# Patient Record
Sex: FEMALE | Race: WHITE | NOT HISPANIC OR LATINO | Employment: UNEMPLOYED | URBAN - NONMETROPOLITAN AREA
[De-identification: names, ages, dates, MRNs, and addresses within clinical notes are randomized per-mention and may not be internally consistent; named-entity substitution may affect disease eponyms.]

---

## 2020-02-21 ENCOUNTER — HOSPITAL ENCOUNTER (EMERGENCY)
Facility: HOSPITAL | Age: 20
Discharge: STILL ADMITTED OR ADMINISTRATIVE | End: 2020-02-21
Attending: FAMILY MEDICINE

## 2020-02-21 ENCOUNTER — HOSPITAL ENCOUNTER (INPATIENT)
Facility: HOSPITAL | Age: 20
LOS: 1 days | Discharge: NURSING, REHAB OR LONG-TERM CARE | End: 2020-02-22
Attending: PSYCHIATRY & NEUROLOGY | Admitting: PSYCHIATRY & NEUROLOGY

## 2020-02-21 VITALS
OXYGEN SATURATION: 98 % | BODY MASS INDEX: 26.87 KG/M2 | RESPIRATION RATE: 18 BRPM | WEIGHT: 161.25 LBS | HEIGHT: 65 IN | TEMPERATURE: 97.8 F | HEART RATE: 73 BPM | SYSTOLIC BLOOD PRESSURE: 106 MMHG | DIASTOLIC BLOOD PRESSURE: 68 MMHG

## 2020-02-21 DIAGNOSIS — A49.9 BACTERIAL UTI: ICD-10-CM

## 2020-02-21 DIAGNOSIS — N39.0 BACTERIAL UTI: ICD-10-CM

## 2020-02-21 DIAGNOSIS — F19.10 POLYSUBSTANCE ABUSE (HCC): Primary | ICD-10-CM

## 2020-02-21 LAB
6-ACETYL MORPHINE: POSITIVE
ALBUMIN SERPL-MCNC: 4.5 G/DL (ref 3.5–5.2)
ALBUMIN/GLOB SERPL: 1.6 G/DL
ALP SERPL-CCNC: 106 U/L (ref 39–117)
ALT SERPL W P-5'-P-CCNC: 13 U/L (ref 1–33)
AMPHET+METHAMPHET UR QL: POSITIVE
ANION GAP SERPL CALCULATED.3IONS-SCNC: 12.6 MMOL/L (ref 5–15)
AST SERPL-CCNC: 24 U/L (ref 1–32)
B-HCG UR QL: NEGATIVE
BACTERIA UR QL AUTO: ABNORMAL /HPF
BARBITURATES UR QL SCN: NEGATIVE
BASOPHILS # BLD AUTO: 0.03 10*3/MM3 (ref 0–0.2)
BASOPHILS NFR BLD AUTO: 0.3 % (ref 0–1.5)
BENZODIAZ UR QL SCN: NEGATIVE
BILIRUB SERPL-MCNC: 0.4 MG/DL (ref 0.2–1.2)
BILIRUB UR QL STRIP: ABNORMAL
BUN BLD-MCNC: 9 MG/DL (ref 6–20)
BUN/CREAT SERPL: 11 (ref 7–25)
BUPRENORPHINE SERPL-MCNC: NEGATIVE NG/ML
C TRACH RRNA CVX QL NAA+PROBE: NOT DETECTED
CALCIUM SPEC-SCNC: 9.8 MG/DL (ref 8.6–10.5)
CANNABINOIDS SERPL QL: NEGATIVE
CHLORIDE SERPL-SCNC: 103 MMOL/L (ref 98–107)
CLARITY UR: ABNORMAL
CO2 SERPL-SCNC: 25.4 MMOL/L (ref 22–29)
COCAINE UR QL: NEGATIVE
COLOR UR: ABNORMAL
CREAT BLD-MCNC: 0.82 MG/DL (ref 0.57–1)
DEPRECATED RDW RBC AUTO: 45.4 FL (ref 37–54)
EOSINOPHIL # BLD AUTO: 0.42 10*3/MM3 (ref 0–0.4)
EOSINOPHIL NFR BLD AUTO: 4.1 % (ref 0.3–6.2)
ERYTHROCYTE [DISTWIDTH] IN BLOOD BY AUTOMATED COUNT: 13.4 % (ref 12.3–15.4)
ETHANOL BLD-MCNC: <10 MG/DL (ref 0–10)
ETHANOL UR QL: <0.01 %
GFR SERPL CREATININE-BSD FRML MDRD: 88 ML/MIN/1.73
GLOBULIN UR ELPH-MCNC: 2.8 GM/DL
GLUCOSE BLD-MCNC: 98 MG/DL (ref 65–99)
GLUCOSE UR STRIP-MCNC: NEGATIVE MG/DL
HCT VFR BLD AUTO: 42.1 % (ref 34–46.6)
HGB BLD-MCNC: 13.3 G/DL (ref 12–15.9)
HGB UR QL STRIP.AUTO: ABNORMAL
HYALINE CASTS UR QL AUTO: ABNORMAL /LPF
IMM GRANULOCYTES # BLD AUTO: 0.04 10*3/MM3 (ref 0–0.05)
IMM GRANULOCYTES NFR BLD AUTO: 0.4 % (ref 0–0.5)
KETONES UR QL STRIP: NEGATIVE
LEUKOCYTE ESTERASE UR QL STRIP.AUTO: ABNORMAL
LYMPHOCYTES # BLD AUTO: 4.25 10*3/MM3 (ref 0.7–3.1)
LYMPHOCYTES NFR BLD AUTO: 41.3 % (ref 19.6–45.3)
MAGNESIUM SERPL-MCNC: 2 MG/DL (ref 1.6–2.6)
MCH RBC QN AUTO: 29 PG (ref 26.6–33)
MCHC RBC AUTO-ENTMCNC: 31.6 G/DL (ref 31.5–35.7)
MCV RBC AUTO: 91.9 FL (ref 79–97)
METHADONE UR QL SCN: NEGATIVE
MONOCYTES # BLD AUTO: 0.6 10*3/MM3 (ref 0.1–0.9)
MONOCYTES NFR BLD AUTO: 5.8 % (ref 5–12)
N GONORRHOEA RRNA SPEC QL NAA+PROBE: DETECTED
NEUTROPHILS # BLD AUTO: 4.96 10*3/MM3 (ref 1.7–7)
NEUTROPHILS NFR BLD AUTO: 48.1 % (ref 42.7–76)
NITRITE UR QL STRIP: POSITIVE
NRBC BLD AUTO-RTO: 0 /100 WBC (ref 0–0.2)
OPIATES UR QL: POSITIVE
OXYCODONE UR QL SCN: NEGATIVE
PCP UR QL SCN: NEGATIVE
PH UR STRIP.AUTO: <=5 [PH] (ref 5–8)
PLATELET # BLD AUTO: 233 10*3/MM3 (ref 140–450)
PMV BLD AUTO: 9.4 FL (ref 6–12)
POTASSIUM BLD-SCNC: 3.4 MMOL/L (ref 3.5–5.2)
PROT SERPL-MCNC: 7.3 G/DL (ref 6–8.5)
PROT UR QL STRIP: ABNORMAL
RBC # BLD AUTO: 4.58 10*6/MM3 (ref 3.77–5.28)
RBC # UR: ABNORMAL /HPF
REF LAB TEST METHOD: ABNORMAL
SODIUM BLD-SCNC: 141 MMOL/L (ref 136–145)
SP GR UR STRIP: 1.03 (ref 1–1.03)
SQUAMOUS #/AREA URNS HPF: ABNORMAL /HPF
UROBILINOGEN UR QL STRIP: ABNORMAL
WBC NRBC COR # BLD: 10.3 10*3/MM3 (ref 3.4–10.8)
WBC UR QL AUTO: ABNORMAL /HPF

## 2020-02-21 PROCEDURE — 80307 DRUG TEST PRSMV CHEM ANLYZR: CPT | Performed by: EMERGENCY MEDICINE

## 2020-02-21 PROCEDURE — 87591 N.GONORRHOEAE DNA AMP PROB: CPT | Performed by: PHYSICIAN ASSISTANT

## 2020-02-21 PROCEDURE — 93010 ELECTROCARDIOGRAM REPORT: CPT | Performed by: INTERNAL MEDICINE

## 2020-02-21 PROCEDURE — 83735 ASSAY OF MAGNESIUM: CPT | Performed by: EMERGENCY MEDICINE

## 2020-02-21 PROCEDURE — 87086 URINE CULTURE/COLONY COUNT: CPT | Performed by: PHYSICIAN ASSISTANT

## 2020-02-21 PROCEDURE — 87491 CHLMYD TRACH DNA AMP PROBE: CPT | Performed by: PHYSICIAN ASSISTANT

## 2020-02-21 PROCEDURE — 85025 COMPLETE CBC W/AUTO DIFF WBC: CPT | Performed by: EMERGENCY MEDICINE

## 2020-02-21 PROCEDURE — 93005 ELECTROCARDIOGRAM TRACING: CPT | Performed by: PSYCHIATRY & NEUROLOGY

## 2020-02-21 PROCEDURE — 81001 URINALYSIS AUTO W/SCOPE: CPT | Performed by: EMERGENCY MEDICINE

## 2020-02-21 PROCEDURE — 81025 URINE PREGNANCY TEST: CPT | Performed by: EMERGENCY MEDICINE

## 2020-02-21 PROCEDURE — HZ2ZZZZ DETOXIFICATION SERVICES FOR SUBSTANCE ABUSE TREATMENT: ICD-10-PCS | Performed by: PSYCHIATRY & NEUROLOGY

## 2020-02-21 PROCEDURE — 80053 COMPREHEN METABOLIC PANEL: CPT | Performed by: EMERGENCY MEDICINE

## 2020-02-21 RX ORDER — ONDANSETRON 4 MG/1
4 TABLET, FILM COATED ORAL EVERY 6 HOURS PRN
Status: DISCONTINUED | OUTPATIENT
Start: 2020-02-21 | End: 2020-02-22

## 2020-02-21 RX ORDER — CLONIDINE HYDROCHLORIDE 0.1 MG/1
0.1 TABLET ORAL 4 TIMES DAILY PRN
Status: DISCONTINUED | OUTPATIENT
Start: 2020-02-21 | End: 2020-02-22

## 2020-02-21 RX ORDER — LORAZEPAM 1 MG/1
1 TABLET ORAL EVERY 4 HOURS PRN
Status: DISCONTINUED | OUTPATIENT
Start: 2020-02-24 | End: 2020-02-22

## 2020-02-21 RX ORDER — HYDROXYZINE 50 MG/1
50 TABLET, FILM COATED ORAL NIGHTLY
Status: DISCONTINUED | OUTPATIENT
Start: 2020-02-21 | End: 2020-02-22

## 2020-02-21 RX ORDER — ZONISAMIDE 100 MG/1
400 CAPSULE ORAL NIGHTLY
Status: DISCONTINUED | OUTPATIENT
Start: 2020-02-21 | End: 2020-02-22

## 2020-02-21 RX ORDER — ZIPRASIDONE HYDROCHLORIDE 20 MG/1
20 CAPSULE ORAL 2 TIMES DAILY WITH MEALS
Status: DISCONTINUED | OUTPATIENT
Start: 2020-02-21 | End: 2020-02-22

## 2020-02-21 RX ORDER — ZONISAMIDE 100 MG/1
400 CAPSULE ORAL NIGHTLY
COMMUNITY

## 2020-02-21 RX ORDER — LORAZEPAM 2 MG/1
2 TABLET ORAL
Status: DISCONTINUED | OUTPATIENT
Start: 2020-02-21 | End: 2020-02-22

## 2020-02-21 RX ORDER — LORAZEPAM 0.5 MG/1
0.5 TABLET ORAL
Status: DISCONTINUED | OUTPATIENT
Start: 2020-02-25 | End: 2020-02-22

## 2020-02-21 RX ORDER — ACETAMINOPHEN 325 MG/1
650 TABLET ORAL EVERY 6 HOURS PRN
Status: DISCONTINUED | OUTPATIENT
Start: 2020-02-21 | End: 2020-02-22

## 2020-02-21 RX ORDER — BENZTROPINE MESYLATE 1 MG/ML
1 INJECTION INTRAMUSCULAR; INTRAVENOUS ONCE AS NEEDED
Status: DISCONTINUED | OUTPATIENT
Start: 2020-02-21 | End: 2020-02-22

## 2020-02-21 RX ORDER — NITROFURANTOIN 25; 75 MG/1; MG/1
100 CAPSULE ORAL EVERY 12 HOURS SCHEDULED
Status: DISCONTINUED | OUTPATIENT
Start: 2020-02-21 | End: 2020-02-22

## 2020-02-21 RX ORDER — LEVOTHYROXINE SODIUM 0.03 MG/1
25 TABLET ORAL DAILY
Status: DISCONTINUED | OUTPATIENT
Start: 2020-02-22 | End: 2020-02-22

## 2020-02-21 RX ORDER — HYDROXYZINE 50 MG/1
50 TABLET, FILM COATED ORAL EVERY 6 HOURS PRN
Status: DISCONTINUED | OUTPATIENT
Start: 2020-02-21 | End: 2020-02-22

## 2020-02-21 RX ORDER — LOPERAMIDE HYDROCHLORIDE 2 MG/1
2 CAPSULE ORAL
Status: DISCONTINUED | OUTPATIENT
Start: 2020-02-21 | End: 2020-02-22

## 2020-02-21 RX ORDER — CLONIDINE HYDROCHLORIDE 0.1 MG/1
0.1 TABLET ORAL 3 TIMES DAILY PRN
Status: DISCONTINUED | OUTPATIENT
Start: 2020-02-23 | End: 2020-02-22

## 2020-02-21 RX ORDER — BENZONATATE 100 MG/1
100 CAPSULE ORAL 3 TIMES DAILY PRN
Status: DISCONTINUED | OUTPATIENT
Start: 2020-02-21 | End: 2020-02-22

## 2020-02-21 RX ORDER — LAMOTRIGINE 200 MG/1
200 TABLET ORAL 2 TIMES DAILY
COMMUNITY

## 2020-02-21 RX ORDER — LORAZEPAM 2 MG/1
2 TABLET ORAL
Status: DISCONTINUED | OUTPATIENT
Start: 2020-02-22 | End: 2020-02-22

## 2020-02-21 RX ORDER — LEVOTHYROXINE SODIUM 0.03 MG/1
25 TABLET ORAL DAILY
COMMUNITY

## 2020-02-21 RX ORDER — CLONIDINE HYDROCHLORIDE 0.1 MG/1
0.1 TABLET ORAL 4 TIMES DAILY PRN
Status: DISCONTINUED | OUTPATIENT
Start: 2020-02-22 | End: 2020-02-22

## 2020-02-21 RX ORDER — LAMOTRIGINE 100 MG/1
200 TABLET ORAL 2 TIMES DAILY
Status: DISCONTINUED | OUTPATIENT
Start: 2020-02-21 | End: 2020-02-22

## 2020-02-21 RX ORDER — FAMOTIDINE 20 MG/1
20 TABLET, FILM COATED ORAL 2 TIMES DAILY PRN
Status: DISCONTINUED | OUTPATIENT
Start: 2020-02-21 | End: 2020-02-22

## 2020-02-21 RX ORDER — AZITHROMYCIN 250 MG/1
1000 TABLET, FILM COATED ORAL ONCE
Status: DISCONTINUED | OUTPATIENT
Start: 2020-02-22 | End: 2020-02-22

## 2020-02-21 RX ORDER — CLONIDINE HYDROCHLORIDE 0.1 MG/1
0.1 TABLET ORAL 4 TIMES DAILY PRN
Status: DISCONTINUED | OUTPATIENT
Start: 2020-02-21 | End: 2020-02-21

## 2020-02-21 RX ORDER — TRAZODONE HYDROCHLORIDE 50 MG/1
50 TABLET ORAL NIGHTLY PRN
Status: DISCONTINUED | OUTPATIENT
Start: 2020-02-21 | End: 2020-02-22

## 2020-02-21 RX ORDER — LORAZEPAM 0.5 MG/1
0.5 TABLET ORAL EVERY 4 HOURS PRN
Status: DISCONTINUED | OUTPATIENT
Start: 2020-02-25 | End: 2020-02-22

## 2020-02-21 RX ORDER — ALUMINA, MAGNESIA, AND SIMETHICONE 2400; 2400; 240 MG/30ML; MG/30ML; MG/30ML
15 SUSPENSION ORAL EVERY 6 HOURS PRN
Status: DISCONTINUED | OUTPATIENT
Start: 2020-02-21 | End: 2020-02-22

## 2020-02-21 RX ORDER — LORAZEPAM 2 MG/1
2 TABLET ORAL EVERY 4 HOURS PRN
Status: DISCONTINUED | OUTPATIENT
Start: 2020-02-22 | End: 2020-02-22

## 2020-02-21 RX ORDER — BENZTROPINE MESYLATE 1 MG/1
2 TABLET ORAL ONCE AS NEEDED
Status: DISCONTINUED | OUTPATIENT
Start: 2020-02-21 | End: 2020-02-22

## 2020-02-21 RX ORDER — CYCLOBENZAPRINE HCL 10 MG
10 TABLET ORAL 3 TIMES DAILY PRN
Status: DISCONTINUED | OUTPATIENT
Start: 2020-02-21 | End: 2020-02-22

## 2020-02-21 RX ORDER — LORAZEPAM 1 MG/1
1 TABLET ORAL
Status: DISCONTINUED | OUTPATIENT
Start: 2020-02-24 | End: 2020-02-22

## 2020-02-21 RX ORDER — POTASSIUM CHLORIDE 20 MEQ/1
20 TABLET, EXTENDED RELEASE ORAL ONCE
Status: COMPLETED | OUTPATIENT
Start: 2020-02-21 | End: 2020-02-21

## 2020-02-21 RX ORDER — DICYCLOMINE HYDROCHLORIDE 10 MG/1
10 CAPSULE ORAL 3 TIMES DAILY PRN
Status: DISCONTINUED | OUTPATIENT
Start: 2020-02-21 | End: 2020-02-22

## 2020-02-21 RX ORDER — CLONIDINE HYDROCHLORIDE 0.1 MG/1
0.1 TABLET ORAL 2 TIMES DAILY PRN
Status: DISCONTINUED | OUTPATIENT
Start: 2020-02-24 | End: 2020-02-22

## 2020-02-21 RX ORDER — ECHINACEA PURPUREA EXTRACT 125 MG
2 TABLET ORAL AS NEEDED
Status: DISCONTINUED | OUTPATIENT
Start: 2020-02-21 | End: 2020-02-22

## 2020-02-21 RX ORDER — CLONIDINE HYDROCHLORIDE 0.1 MG/1
0.1 TABLET ORAL DAILY PRN
Status: DISCONTINUED | OUTPATIENT
Start: 2020-02-25 | End: 2020-02-22

## 2020-02-21 RX ORDER — IBUPROFEN 400 MG/1
400 TABLET ORAL EVERY 6 HOURS PRN
Status: DISCONTINUED | OUTPATIENT
Start: 2020-02-21 | End: 2020-02-22

## 2020-02-21 RX ORDER — HYDROXYZINE PAMOATE 50 MG/1
50 CAPSULE ORAL
COMMUNITY
End: 2020-02-25 | Stop reason: HOSPADM

## 2020-02-21 RX ADMIN — ZIPRASIDONE HYDROCHLORIDE 20 MG: 20 CAPSULE ORAL at 22:45

## 2020-02-21 RX ADMIN — POTASSIUM CHLORIDE 20 MEQ: 1500 TABLET, EXTENDED RELEASE ORAL at 20:09

## 2020-02-21 RX ADMIN — HYDROXYZINE HYDROCHLORIDE 50 MG: 50 TABLET ORAL at 22:41

## 2020-02-21 RX ADMIN — IBUPROFEN 400 MG: 400 TABLET, FILM COATED ORAL at 23:03

## 2020-02-21 RX ADMIN — NITROFURANTOIN MONOHYDRATE/MACROCRYSTALLINE 100 MG: 25; 75 CAPSULE ORAL at 22:41

## 2020-02-21 RX ADMIN — LAMOTRIGINE 200 MG: 100 TABLET ORAL at 22:45

## 2020-02-21 NOTE — NURSING NOTE
Patient to intake. Safety checks initiated.  pockets emptied and search completed with two staff members present. Items logged and placed in cabinet in intake area. Pt placed in safe room for evaluation. Will continue to monitor pt status. Waiting for ED provider to clear pt me  dically. Upon search of patient. One tube of chap stick found in bra. Placed in belonging bag and on sheet. Patient noted to be  A little drowsy upon search.

## 2020-02-22 ENCOUNTER — APPOINTMENT (OUTPATIENT)
Dept: GENERAL RADIOLOGY | Facility: HOSPITAL | Age: 20
End: 2020-02-22

## 2020-02-22 ENCOUNTER — HOSPITAL ENCOUNTER (INPATIENT)
Facility: HOSPITAL | Age: 20
LOS: 3 days | Discharge: NURSING, REHAB OR LONG-TERM CARE | End: 2020-02-25
Attending: HOSPITALIST | Admitting: HOSPITALIST

## 2020-02-22 VITALS
RESPIRATION RATE: 12 BRPM | SYSTOLIC BLOOD PRESSURE: 116 MMHG | HEIGHT: 65 IN | BODY MASS INDEX: 26.66 KG/M2 | WEIGHT: 160 LBS | TEMPERATURE: 97.1 F | OXYGEN SATURATION: 62 % | HEART RATE: 70 BPM | DIASTOLIC BLOOD PRESSURE: 64 MMHG

## 2020-02-22 PROBLEM — R41.89 UNRESPONSIVE: Status: ACTIVE | Noted: 2020-02-22

## 2020-02-22 LAB
A-A DO2: 12.2 MMHG (ref 0–300)
A-A DO2: 25.4 MMHG (ref 0–300)
ALBUMIN SERPL-MCNC: 3.81 G/DL (ref 3.5–5.2)
ALBUMIN/GLOB SERPL: 1.5 G/DL
ALP SERPL-CCNC: 96 U/L (ref 39–117)
ALT SERPL W P-5'-P-CCNC: 14 U/L (ref 1–33)
ANION GAP SERPL CALCULATED.3IONS-SCNC: 11.1 MMOL/L (ref 5–15)
ARTERIAL PATENCY WRIST A: POSITIVE
ARTERIAL PATENCY WRIST A: POSITIVE
AST SERPL-CCNC: 22 U/L (ref 1–32)
ATMOSPHERIC PRESS: 737 MMHG
ATMOSPHERIC PRESS: 737 MMHG
BACTERIA SPEC AEROBE CULT: ABNORMAL
BASE EXCESS BLDA CALC-SCNC: -1.4 MMOL/L (ref 0–2)
BASE EXCESS BLDA CALC-SCNC: -8 MMOL/L (ref 0–2)
BASOPHILS # BLD AUTO: 0.04 10*3/MM3 (ref 0–0.2)
BASOPHILS NFR BLD AUTO: 0.2 % (ref 0–1.5)
BDY SITE: ABNORMAL
BDY SITE: ABNORMAL
BILIRUB SERPL-MCNC: 0.5 MG/DL (ref 0.2–1.2)
BODY TEMPERATURE: 0 C
BODY TEMPERATURE: 0 C
BUN BLD-MCNC: 10 MG/DL (ref 6–20)
BUN/CREAT SERPL: 11 (ref 7–25)
CALCIUM SPEC-SCNC: 8.6 MG/DL (ref 8.6–10.5)
CHLORIDE SERPL-SCNC: 103 MMOL/L (ref 98–107)
CK SERPL-CCNC: 333 U/L (ref 20–180)
CK SERPL-CCNC: 397 U/L (ref 20–180)
CO2 BLDA-SCNC: 25.6 MMOL/L (ref 22–33)
CO2 BLDA-SCNC: 26.8 MMOL/L (ref 22–33)
CO2 SERPL-SCNC: 22.9 MMOL/L (ref 22–29)
COHGB MFR BLD: 0.8 % (ref 0–5)
COHGB MFR BLD: 0.8 % (ref 0–5)
CREAT BLD-MCNC: 0.91 MG/DL (ref 0.57–1)
D-LACTATE SERPL-SCNC: 1.1 MMOL/L (ref 0.5–2)
DEPRECATED RDW RBC AUTO: 45.4 FL (ref 37–54)
EOSINOPHIL # BLD AUTO: 0.4 10*3/MM3 (ref 0–0.4)
EOSINOPHIL NFR BLD AUTO: 2.3 % (ref 0.3–6.2)
ERYTHROCYTE [DISTWIDTH] IN BLOOD BY AUTOMATED COUNT: 13.4 % (ref 12.3–15.4)
GAS FLOW AIRWAY: 2 LPM
GFR SERPL CREATININE-BSD FRML MDRD: 78 ML/MIN/1.73
GLOBULIN UR ELPH-MCNC: 2.6 GM/DL
GLUCOSE BLD-MCNC: 87 MG/DL (ref 65–99)
HAV IGM SERPL QL IA: ABNORMAL
HBV CORE IGM SERPL QL IA: ABNORMAL
HBV SURFACE AG SERPL QL IA: ABNORMAL
HCO3 BLDA-SCNC: 24.2 MMOL/L (ref 20–26)
HCO3 BLDA-SCNC: 24.3 MMOL/L (ref 20–26)
HCT VFR BLD AUTO: 39.7 % (ref 34–46.6)
HCT VFR BLD CALC: 37.7 % (ref 38–51)
HCT VFR BLD CALC: 40.8 % (ref 38–51)
HCV AB SER DONR QL: REACTIVE
HGB BLD-MCNC: 12.5 G/DL (ref 12–15.9)
HGB BLDA-MCNC: 12.3 G/DL (ref 13.5–17.5)
HGB BLDA-MCNC: 13.3 G/DL (ref 13.5–17.5)
HIV1+2 AB SER QL: NORMAL
HOLD SPECIMEN: NORMAL
HOROWITZ INDEX BLD+IHG-RTO: 21 %
HOROWITZ INDEX BLD+IHG-RTO: 28 %
IMM GRANULOCYTES # BLD AUTO: 0.1 10*3/MM3 (ref 0–0.05)
IMM GRANULOCYTES NFR BLD AUTO: 0.6 % (ref 0–0.5)
LYMPHOCYTES # BLD AUTO: 3.3 10*3/MM3 (ref 0.7–3.1)
LYMPHOCYTES NFR BLD AUTO: 18.7 % (ref 19.6–45.3)
Lab: ABNORMAL
MCH RBC QN AUTO: 29.2 PG (ref 26.6–33)
MCHC RBC AUTO-ENTMCNC: 31.5 G/DL (ref 31.5–35.7)
MCV RBC AUTO: 92.8 FL (ref 79–97)
METHGB BLD QL: 0.2 % (ref 0–3)
METHGB BLD QL: 0.3 % (ref 0–3)
MODALITY: ABNORMAL
MODALITY: ABNORMAL
MONOCYTES # BLD AUTO: 0.8 10*3/MM3 (ref 0.1–0.9)
MONOCYTES NFR BLD AUTO: 4.5 % (ref 5–12)
NEUTROPHILS # BLD AUTO: 12.99 10*3/MM3 (ref 1.7–7)
NEUTROPHILS NFR BLD AUTO: 73.7 % (ref 42.7–76)
NOTE: ABNORMAL
NOTE: ABNORMAL
NOTIFIED BY: ABNORMAL
NOTIFIED WHO: ABNORMAL
NRBC BLD AUTO-RTO: 0 /100 WBC (ref 0–0.2)
OXYHGB MFR BLDV: 43.2 % (ref 94–99)
OXYHGB MFR BLDV: 98 % (ref 94–99)
PCO2 BLDA: 43.9 MM HG (ref 35–45)
PCO2 BLDA: 86.4 MM HG (ref 35–45)
PCO2 TEMP ADJ BLD: ABNORMAL MM[HG]
PCO2 TEMP ADJ BLD: ABNORMAL MM[HG]
PH BLDA: 7.05 PH UNITS (ref 7.35–7.45)
PH BLDA: 7.35 PH UNITS (ref 7.35–7.45)
PH, TEMP CORRECTED: ABNORMAL
PH, TEMP CORRECTED: ABNORMAL
PLATELET # BLD AUTO: 214 10*3/MM3 (ref 140–450)
PMV BLD AUTO: 9.5 FL (ref 6–12)
PO2 BLDA: 119 MM HG (ref 83–108)
PO2 BLDA: 35.2 MM HG (ref 83–108)
PO2 TEMP ADJ BLD: ABNORMAL MM[HG]
PO2 TEMP ADJ BLD: ABNORMAL MM[HG]
POTASSIUM BLD-SCNC: 3.3 MMOL/L (ref 3.5–5.2)
POTASSIUM BLD-SCNC: 3.6 MMOL/L (ref 3.5–5.2)
PROT SERPL-MCNC: 6.4 G/DL (ref 6–8.5)
RBC # BLD AUTO: 4.28 10*6/MM3 (ref 3.77–5.28)
SAO2 % BLDCOA: 43.7 % (ref 94–99)
SAO2 % BLDCOA: 99 % (ref 94–99)
SODIUM BLD-SCNC: 137 MMOL/L (ref 136–145)
T3FREE SERPL-MCNC: 6.29 PG/ML (ref 2–4.4)
T4 FREE SERPL-MCNC: 1.63 NG/DL (ref 0.93–1.7)
TSH SERPL DL<=0.05 MIU/L-ACNC: 13.51 UIU/ML (ref 0.27–4.2)
VENTILATOR MODE: ABNORMAL
VENTILATOR MODE: ABNORMAL
WBC NRBC COR # BLD: 17.63 10*3/MM3 (ref 3.4–10.8)

## 2020-02-22 PROCEDURE — 94799 UNLISTED PULMONARY SVC/PX: CPT

## 2020-02-22 PROCEDURE — 84481 FREE ASSAY (FT-3): CPT | Performed by: HOSPITALIST

## 2020-02-22 PROCEDURE — 87040 BLOOD CULTURE FOR BACTERIA: CPT | Performed by: NURSE PRACTITIONER

## 2020-02-22 PROCEDURE — 36600 WITHDRAWAL OF ARTERIAL BLOOD: CPT

## 2020-02-22 PROCEDURE — 71045 X-RAY EXAM CHEST 1 VIEW: CPT

## 2020-02-22 PROCEDURE — 25010000002 NALOXONE PER 1 MG: Performed by: NURSE PRACTITIONER

## 2020-02-22 PROCEDURE — 0BH17EZ INSERTION OF ENDOTRACHEAL AIRWAY INTO TRACHEA, VIA NATURAL OR ARTIFICIAL OPENING: ICD-10-PCS | Performed by: HOSPITALIST

## 2020-02-22 PROCEDURE — 84439 ASSAY OF FREE THYROXINE: CPT | Performed by: HOSPITALIST

## 2020-02-22 PROCEDURE — 80053 COMPREHEN METABOLIC PANEL: CPT | Performed by: HOSPITALIST

## 2020-02-22 PROCEDURE — 93005 ELECTROCARDIOGRAM TRACING: CPT | Performed by: HOSPITALIST

## 2020-02-22 PROCEDURE — 80074 ACUTE HEPATITIS PANEL: CPT | Performed by: PSYCHIATRY & NEUROLOGY

## 2020-02-22 PROCEDURE — 85025 COMPLETE CBC W/AUTO DIFF WBC: CPT | Performed by: HOSPITALIST

## 2020-02-22 PROCEDURE — 25010000002 LORAZEPAM PER 2 MG

## 2020-02-22 PROCEDURE — 25010000002 CEFTRIAXONE: Performed by: INTERNAL MEDICINE

## 2020-02-22 PROCEDURE — 94002 VENT MGMT INPAT INIT DAY: CPT

## 2020-02-22 PROCEDURE — 25010000002 NALOXONE PER 1 MG: Performed by: INTERNAL MEDICINE

## 2020-02-22 PROCEDURE — 82550 ASSAY OF CK (CPK): CPT | Performed by: HOSPITALIST

## 2020-02-22 PROCEDURE — 82805 BLOOD GASES W/O2 SATURATION: CPT

## 2020-02-22 PROCEDURE — G0432 EIA HIV-1/HIV-2 SCREEN: HCPCS | Performed by: PSYCHIATRY & NEUROLOGY

## 2020-02-22 PROCEDURE — 31500 INSERT EMERGENCY AIRWAY: CPT

## 2020-02-22 PROCEDURE — 5A1935Z RESPIRATORY VENTILATION, LESS THAN 24 CONSECUTIVE HOURS: ICD-10-PCS | Performed by: HOSPITALIST

## 2020-02-22 PROCEDURE — 99223 1ST HOSP IP/OBS HIGH 75: CPT | Performed by: HOSPITALIST

## 2020-02-22 PROCEDURE — 71045 X-RAY EXAM CHEST 1 VIEW: CPT | Performed by: RADIOLOGY

## 2020-02-22 PROCEDURE — 83605 ASSAY OF LACTIC ACID: CPT | Performed by: HOSPITALIST

## 2020-02-22 PROCEDURE — 82375 ASSAY CARBOXYHB QUANT: CPT

## 2020-02-22 PROCEDURE — 25010000002 NALOXONE PER 1 MG: Performed by: HOSPITALIST

## 2020-02-22 PROCEDURE — 84132 ASSAY OF SERUM POTASSIUM: CPT | Performed by: PSYCHIATRY & NEUROLOGY

## 2020-02-22 PROCEDURE — 93010 ELECTROCARDIOGRAM REPORT: CPT | Performed by: INTERNAL MEDICINE

## 2020-02-22 PROCEDURE — 84443 ASSAY THYROID STIM HORMONE: CPT | Performed by: HOSPITALIST

## 2020-02-22 PROCEDURE — 25010000002 CEFTRIAXONE PER 250 MG: Performed by: HOSPITALIST

## 2020-02-22 PROCEDURE — 25010000002 PROPOFOL 10 MG/ML EMULSION: Performed by: FAMILY MEDICINE

## 2020-02-22 PROCEDURE — 83050 HGB METHEMOGLOBIN QUAN: CPT

## 2020-02-22 RX ORDER — NICOTINE 21 MG/24HR
1 PATCH, TRANSDERMAL 24 HOURS TRANSDERMAL
Status: DISCONTINUED | OUTPATIENT
Start: 2020-02-23 | End: 2020-02-25 | Stop reason: HOSPADM

## 2020-02-22 RX ORDER — LEVOTHYROXINE SODIUM 0.03 MG/1
25 TABLET ORAL DAILY
Status: DISCONTINUED | OUTPATIENT
Start: 2020-02-22 | End: 2020-02-25 | Stop reason: HOSPADM

## 2020-02-22 RX ORDER — NALOXONE HCL 0.4 MG/ML
0.4 VIAL (ML) INJECTION AS NEEDED
Status: DISCONTINUED | OUTPATIENT
Start: 2020-02-22 | End: 2020-02-25 | Stop reason: HOSPADM

## 2020-02-22 RX ORDER — SODIUM CHLORIDE 0.9 % (FLUSH) 0.9 %
10 SYRINGE (ML) INJECTION EVERY 12 HOURS SCHEDULED
Status: DISCONTINUED | OUTPATIENT
Start: 2020-02-22 | End: 2020-02-25 | Stop reason: HOSPADM

## 2020-02-22 RX ORDER — NALOXONE HYDROCHLORIDE 1 MG/ML
2 INJECTION INTRAMUSCULAR; INTRAVENOUS; SUBCUTANEOUS ONCE
Status: COMPLETED | OUTPATIENT
Start: 2020-02-22 | End: 2020-02-22

## 2020-02-22 RX ORDER — PROPOFOL 10 MG/ML
40 VIAL (ML) INTRAVENOUS ONCE
Status: COMPLETED | OUTPATIENT
Start: 2020-02-22 | End: 2020-02-22

## 2020-02-22 RX ORDER — SODIUM CHLORIDE 0.9 % (FLUSH) 0.9 %
10 SYRINGE (ML) INJECTION AS NEEDED
Status: DISCONTINUED | OUTPATIENT
Start: 2020-02-22 | End: 2020-02-25 | Stop reason: HOSPADM

## 2020-02-22 RX ORDER — ZIPRASIDONE HYDROCHLORIDE 20 MG/1
20 CAPSULE ORAL 2 TIMES DAILY WITH MEALS
Status: DISCONTINUED | OUTPATIENT
Start: 2020-02-22 | End: 2020-02-25 | Stop reason: HOSPADM

## 2020-02-22 RX ORDER — ZONISAMIDE 100 MG/1
400 CAPSULE ORAL NIGHTLY
Status: DISCONTINUED | OUTPATIENT
Start: 2020-02-22 | End: 2020-02-25 | Stop reason: HOSPADM

## 2020-02-22 RX ORDER — MAGNESIUM SULFATE HEPTAHYDRATE 40 MG/ML
4 INJECTION, SOLUTION INTRAVENOUS AS NEEDED
Status: DISCONTINUED | OUTPATIENT
Start: 2020-02-22 | End: 2020-02-25 | Stop reason: HOSPADM

## 2020-02-22 RX ORDER — ETOMIDATE 2 MG/ML
40 INJECTION INTRAVENOUS ONCE
Status: COMPLETED | OUTPATIENT
Start: 2020-02-22 | End: 2020-02-22

## 2020-02-22 RX ORDER — LORAZEPAM 2 MG/ML
INJECTION INTRAMUSCULAR
Status: DISCONTINUED
Start: 2020-02-22 | End: 2020-02-22 | Stop reason: HOSPADM

## 2020-02-22 RX ORDER — LAMOTRIGINE 100 MG/1
200 TABLET ORAL 2 TIMES DAILY
Status: DISCONTINUED | OUTPATIENT
Start: 2020-02-22 | End: 2020-02-25 | Stop reason: HOSPADM

## 2020-02-22 RX ORDER — MIDAZOLAM HCL IN 0.9 % NACL/PF 1 MG/ML
PLASTIC BAG, INJECTION (ML) INTRAVENOUS
Status: COMPLETED
Start: 2020-02-22 | End: 2020-02-22

## 2020-02-22 RX ORDER — ZIPRASIDONE HYDROCHLORIDE 20 MG/1
20 CAPSULE ORAL 2 TIMES DAILY WITH MEALS
Status: CANCELLED | OUTPATIENT
Start: 2020-02-22

## 2020-02-22 RX ORDER — ACETAMINOPHEN 325 MG/1
650 TABLET ORAL EVERY 6 HOURS PRN
Status: DISCONTINUED | OUTPATIENT
Start: 2020-02-22 | End: 2020-02-25 | Stop reason: HOSPADM

## 2020-02-22 RX ORDER — SODIUM CHLORIDE 9 MG/ML
50 INJECTION, SOLUTION INTRAVENOUS CONTINUOUS
Status: DISCONTINUED | OUTPATIENT
Start: 2020-02-22 | End: 2020-02-24

## 2020-02-22 RX ORDER — NALOXONE HYDROCHLORIDE 1 MG/ML
1 INJECTION INTRAMUSCULAR; INTRAVENOUS; SUBCUTANEOUS ONCE
Status: DISCONTINUED | OUTPATIENT
Start: 2020-02-22 | End: 2020-02-25 | Stop reason: HOSPADM

## 2020-02-22 RX ORDER — MAGNESIUM SULFATE HEPTAHYDRATE 40 MG/ML
2 INJECTION, SOLUTION INTRAVENOUS AS NEEDED
Status: DISCONTINUED | OUTPATIENT
Start: 2020-02-22 | End: 2020-02-25 | Stop reason: HOSPADM

## 2020-02-22 RX ORDER — MAGNESIUM SULFATE 1 G/100ML
1 INJECTION INTRAVENOUS AS NEEDED
Status: DISCONTINUED | OUTPATIENT
Start: 2020-02-22 | End: 2020-02-25 | Stop reason: HOSPADM

## 2020-02-22 RX ORDER — NALOXONE HYDROCHLORIDE 0.4 MG/ML
1 INJECTION, SOLUTION INTRAMUSCULAR; INTRAVENOUS; SUBCUTANEOUS ONCE
Status: COMPLETED | OUTPATIENT
Start: 2020-02-22 | End: 2020-02-22

## 2020-02-22 RX ORDER — NICOTINE 21 MG/24HR
1 PATCH, TRANSDERMAL 24 HOURS TRANSDERMAL ONCE
Status: COMPLETED | OUTPATIENT
Start: 2020-02-22 | End: 2020-02-23

## 2020-02-22 RX ORDER — HYDROXYZINE 50 MG/1
50 TABLET, FILM COATED ORAL NIGHTLY
Status: CANCELLED | OUTPATIENT
Start: 2020-02-22

## 2020-02-22 RX ORDER — AZITHROMYCIN 250 MG/1
1000 TABLET, FILM COATED ORAL ONCE
Status: COMPLETED | OUTPATIENT
Start: 2020-02-22 | End: 2020-02-25

## 2020-02-22 RX ORDER — LEVOTHYROXINE SODIUM 0.03 MG/1
25 TABLET ORAL DAILY
Status: CANCELLED | OUTPATIENT
Start: 2020-02-22

## 2020-02-22 RX ORDER — LAMOTRIGINE 100 MG/1
200 TABLET ORAL 2 TIMES DAILY
Status: CANCELLED | OUTPATIENT
Start: 2020-02-22

## 2020-02-22 RX ORDER — ZONISAMIDE 100 MG/1
400 CAPSULE ORAL NIGHTLY
Status: CANCELLED | OUTPATIENT
Start: 2020-02-22

## 2020-02-22 RX ORDER — NITROFURANTOIN 25; 75 MG/1; MG/1
100 CAPSULE ORAL EVERY 12 HOURS SCHEDULED
Status: DISCONTINUED | OUTPATIENT
Start: 2020-02-22 | End: 2020-02-25 | Stop reason: HOSPADM

## 2020-02-22 RX ADMIN — LAMOTRIGINE 200 MG: 100 TABLET ORAL at 21:50

## 2020-02-22 RX ADMIN — SODIUM CHLORIDE, PRESERVATIVE FREE 10 ML: 5 INJECTION INTRAVENOUS at 21:51

## 2020-02-22 RX ADMIN — PROPOFOL 40 MG: 10 INJECTION, EMULSION INTRAVENOUS at 02:50

## 2020-02-22 RX ADMIN — NALOXONE HYDROCHLORIDE 2 MG: 1 INJECTION PARENTERAL at 23:40

## 2020-02-22 RX ADMIN — BENZOCAINE AND MENTHOL 1 LOZENGE: 15; 3.6 LOZENGE ORAL at 23:44

## 2020-02-22 RX ADMIN — PROPOFOL 40 MG: 10 INJECTION, EMULSION INTRAVENOUS at 02:40

## 2020-02-22 RX ADMIN — NALOXONE HYDROCHLORIDE 2 MG/HR: 0.4 INJECTION, SOLUTION INTRAMUSCULAR; INTRAVENOUS; SUBCUTANEOUS at 22:04

## 2020-02-22 RX ADMIN — ACETAMINOPHEN 650 MG: 325 TABLET ORAL at 18:15

## 2020-02-22 RX ADMIN — NALOXONE HYDROCHLORIDE 0.4 MG: 0.4 INJECTION, SOLUTION INTRAMUSCULAR; INTRAVENOUS; SUBCUTANEOUS at 08:41

## 2020-02-22 RX ADMIN — ETOMIDATE 40 MG: 2 INJECTION, SOLUTION INTRAVENOUS at 02:38

## 2020-02-22 RX ADMIN — SODIUM CHLORIDE 100 ML/HR: 9 INJECTION, SOLUTION INTRAVENOUS at 16:35

## 2020-02-22 RX ADMIN — NALOXONE HYDROCHLORIDE 100 ML/HR: 1 INJECTION PARENTERAL at 23:35

## 2020-02-22 RX ADMIN — NICOTINE 1 PATCH: 14 PATCH, EXTENDED RELEASE TRANSDERMAL at 21:50

## 2020-02-22 RX ADMIN — SODIUM CHLORIDE 1000 ML: 9 INJECTION, SOLUTION INTRAVENOUS at 06:33

## 2020-02-22 RX ADMIN — NALOXONE HYDROCHLORIDE 1 MG/HR: 0.4 INJECTION, SOLUTION INTRAMUSCULAR; INTRAVENOUS; SUBCUTANEOUS at 13:43

## 2020-02-22 RX ADMIN — NALOXONE HYDROCHLORIDE 1 MG/HR: 0.4 INJECTION, SOLUTION INTRAMUSCULAR; INTRAVENOUS; SUBCUTANEOUS at 11:39

## 2020-02-22 RX ADMIN — NALOXONE HYDROCHLORIDE 0.6 MG/HR: 0.4 INJECTION, SOLUTION INTRAMUSCULAR; INTRAVENOUS; SUBCUTANEOUS at 09:09

## 2020-02-22 RX ADMIN — NALOXONE HYDROCHLORIDE 1 MG/HR: 0.4 INJECTION, SOLUTION INTRAMUSCULAR; INTRAVENOUS; SUBCUTANEOUS at 16:37

## 2020-02-22 RX ADMIN — NALOXONE HYDROCHLORIDE 1 MG: 0.4 INJECTION, SOLUTION INTRAMUSCULAR; INTRAVENOUS; SUBCUTANEOUS at 06:39

## 2020-02-22 RX ADMIN — LORAZEPAM 2 MG: 2 INJECTION, SOLUTION INTRAMUSCULAR; INTRAVENOUS at 02:45

## 2020-02-22 RX ADMIN — NALOXONE HYDROCHLORIDE 1 MG/HR: 0.4 INJECTION, SOLUTION INTRAMUSCULAR; INTRAVENOUS; SUBCUTANEOUS at 19:53

## 2020-02-22 RX ADMIN — ZONISAMIDE 400 MG: 100 CAPSULE ORAL at 21:50

## 2020-02-22 RX ADMIN — NITROFURANTOIN MONOHYDRATE/MACROCRYSTALLINE 100 MG: 25; 75 CAPSULE ORAL at 21:50

## 2020-02-22 RX ADMIN — Medication 1 MG/HR: at 06:54

## 2020-02-22 RX ADMIN — LIDOCAINE HYDROCHLORIDE 250 MG: 10 INJECTION, SOLUTION EPIDURAL; INFILTRATION; INTRACAUDAL; PERINEURAL at 06:32

## 2020-02-22 RX ADMIN — CEFTRIAXONE 1 G: 1 INJECTION, POWDER, FOR SOLUTION INTRAMUSCULAR; INTRAVENOUS at 11:35

## 2020-02-22 NOTE — PLAN OF CARE
Problem: Fall Risk (Adult)  Goal: Identify Related Risk Factors and Signs and Symptoms  Outcome: Outcome(s) achieved  Flowsheets (Taken 2/21/2020 9809)  Related Risk Factors (Fall Risk): sleep pattern alteration; neuro disease/injury; depression/anxiety; other (see comments)  Signs and Symptoms (Fall Risk): presence of risk factors  Note:   Patient reports hx of recent seizures and falls. Last one occurring 1 week ago. At high fall risk d/t detox from benzo and noncompliance with medications

## 2020-02-22 NOTE — NURSING NOTE
Diprovan 40mg given by Dr. Maria Antonia Infante. 16French OG tube placed by Shell Kelsey RN at 65cm at the lip and taped to ET tube. Called XRAY for STAT portable chest xray for ET tube placement. 1L NS bolus verbal order per Dr. Maria Antonia Infante started.

## 2020-02-22 NOTE — NURSING NOTE
Dr. Napoleon Gomes notified of positive gonorrhea infection. New orders given for Rocephin 250mg IM once and Zithromax 1gm PO once.

## 2020-02-22 NOTE — NURSING NOTE
40mg of Etomidate given by Dr. Maria Antonia Infante. Respiratory continues to provide bag mask ventilation. Vital signs stable.

## 2020-02-22 NOTE — ED PROVIDER NOTES
Subjective   19-year-old female who presents to the ED today for detox evaluation.  She states she plans to attend [facility] for inpatient drug rehab but needs to go through detox first.  She states she uses heroin and methamphetamine.  She last used both about 3 hours ago.  She states currently she has been having nausea and vomiting.  She states she also feels shaky and has been having body aches.  She denies any alcohol use.  She denies any suicidal ideations.      History provided by:  Patient  Drug / Alcohol Assessment   Primary symptoms comment: requesting detox. This is a new problem. The current episode started 1 to 2 hours ago. The problem has been gradually worsening. Suspected agents include heroin and methamphetamines. Associated symptoms include nausea and vomiting. Pertinent negatives include no fever, no injury, no bladder incontinence and no bowel incontinence. Associated medical issues include addiction treatment.       Review of Systems   Constitutional: Negative.  Negative for fever.   HENT: Negative.    Eyes: Negative.    Respiratory: Negative.    Cardiovascular: Negative.    Gastrointestinal: Positive for nausea and vomiting. Negative for bowel incontinence.   Genitourinary: Negative.  Negative for bladder incontinence.   Musculoskeletal: Positive for myalgias.   Skin: Negative.    Neurological: Positive for tremors.   Psychiatric/Behavioral: Negative.  Negative for suicidal ideas.   All other systems reviewed and are negative.      Past Medical History:   Diagnosis Date   • Bipolar disorder (CMS/HCC)    • Borderline personality disorder (CMS/HCC)    • Depression    • Disease of thyroid gland    • Mood changes    • Seizures (CMS/HCC)     last approx 1 wks ago        No Known Allergies    Past Surgical History:   Procedure Laterality Date   • TONSILLECTOMY         No family history on file.    Social History     Socioeconomic History   • Marital status: Single     Spouse name: Not on file    • Number of children: Not on file   • Years of education: Not on file   • Highest education level: Not on file   Tobacco Use   • Smoking status: Current Some Day Smoker   Substance and Sexual Activity   • Alcohol use: No     Frequency: Never     Comment: occ.    • Drug use: Yes     Types: Benzodiazepines, Heroin, Amphetamines   • Sexual activity: Yes     Partners: Male           Objective   Physical Exam   Constitutional: She is oriented to person, place, and time. She appears well-developed and well-nourished. No distress.   HENT:   Head: Normocephalic and atraumatic.   Right Ear: External ear normal.   Left Ear: External ear normal.   Nose: Nose normal.   Mouth/Throat: Oropharynx is clear and moist.   Eyes: Pupils are equal, round, and reactive to light. Conjunctivae and EOM are normal.   Neck: Normal range of motion. Neck supple.   Cardiovascular: Normal rate, regular rhythm, normal heart sounds and intact distal pulses.   Pulmonary/Chest: Effort normal and breath sounds normal.   Abdominal: Soft. Bowel sounds are normal.   Musculoskeletal: Normal range of motion.   Neurological: She is alert and oriented to person, place, and time.   Skin: Skin is warm and dry. Capillary refill takes less than 2 seconds.   Psychiatric: She has a normal mood and affect. Her behavior is normal. Judgment and thought content normal.   Nursing note and vitals reviewed.      Procedures           ED Course  ED Course as of Feb 21 2000 Fri Feb 21, 2020   1919 Medically clear for detox.  Will start on macrobid for a UTI.  Oral replacement ordered for her hypokalemia.    []   1939 Patient is requesting to be checked for chlamydia and gonorrhea.  This has been ordered.    []      ED Course User Index  [] Vika Pelletier, PA                                           [facility]  Number of Diagnoses or Management Options  Bacterial UTI:   Polysubstance abuse (CMS/[facility]):      Amount and/or Complexity of Data Reviewed  Clinical lab tests:  reviewed    Patient Progress  Patient progress: stable      Final diagnoses:   Polysubstance abuse (CMS/[facility])   Bacterial UTI            Vika Pelletier PA  02/21/20 2000

## 2020-02-22 NOTE — NURSING NOTE
7.5 ET tube placed at 22cm at the lip by Dr. Maria Antonia Infante. Respiratory continues to provide bag mask ventilation. Equal chest rise and fall noted. Equal breath sounds noted.

## 2020-02-22 NOTE — NURSING NOTE
Pt heard by this RN to be grunting. Follow up to room by Louis Schaefer RN and this RN. Patient found to be with shallow respirations, ashy in color, hyper-salivating at mouth, and pin point pupils.  Pt responds to pain. Vital signs obtained with 02 sat at 64%.   Rapid Response called at 0223 by Louis Schaefer RN.

## 2020-02-22 NOTE — NURSING NOTE
Dr. Infante at bedside. Respiratory providing bag mask ventilation. Dr. Infante preparing for intubation.
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
## 2020-02-22 NOTE — NURSING NOTE
Spoke to   via phone. Intake information provided.Careful discussion of labs. Potassium 3.4 with replacement given in Er.  Instructed to admit the patient. Admit orders received. Clonidine and ativan detox admission day. Recheck potassium in am.  Continue with Macrobid 100 mg bid x7 as advised, routine orders RBVOx2.. Patient and ed provider made aware of plan of care. Safety precautions maintained.

## 2020-02-22 NOTE — NURSING NOTE
Pt being transferred to CCU. Transported by Respiratory therapist and CCU nurse. ET Tube in place. Respiratory providing bag mask ventilation. IV is in tact and patent. OG and arias cath in tact and patent. Arias draining clear yellow urine. Vital signs stable. No acute distress noted.

## 2020-02-23 LAB
ANION GAP SERPL CALCULATED.3IONS-SCNC: 8.2 MMOL/L (ref 5–15)
BUN BLD-MCNC: 8 MG/DL (ref 6–20)
BUN/CREAT SERPL: 11.3 (ref 7–25)
CALCIUM SPEC-SCNC: 8.1 MG/DL (ref 8.6–10.5)
CHLORIDE SERPL-SCNC: 113 MMOL/L (ref 98–107)
CO2 SERPL-SCNC: 20.8 MMOL/L (ref 22–29)
CREAT BLD-MCNC: 0.71 MG/DL (ref 0.57–1)
DEPRECATED RDW RBC AUTO: 46.5 FL (ref 37–54)
ERYTHROCYTE [DISTWIDTH] IN BLOOD BY AUTOMATED COUNT: 13.3 % (ref 12.3–15.4)
GFR SERPL CREATININE-BSD FRML MDRD: 104 ML/MIN/1.73
GLUCOSE BLD-MCNC: 113 MG/DL (ref 65–99)
HCT VFR BLD AUTO: 36.6 % (ref 34–46.6)
HGB BLD-MCNC: 11.4 G/DL (ref 12–15.9)
MCH RBC QN AUTO: 29.5 PG (ref 26.6–33)
MCHC RBC AUTO-ENTMCNC: 31.1 G/DL (ref 31.5–35.7)
MCV RBC AUTO: 94.6 FL (ref 79–97)
PLATELET # BLD AUTO: 180 10*3/MM3 (ref 140–450)
PMV BLD AUTO: 9.9 FL (ref 6–12)
POTASSIUM BLD-SCNC: 4.1 MMOL/L (ref 3.5–5.2)
RBC # BLD AUTO: 3.87 10*6/MM3 (ref 3.77–5.28)
SODIUM BLD-SCNC: 142 MMOL/L (ref 136–145)
WBC NRBC COR # BLD: 7.66 10*3/MM3 (ref 3.4–10.8)

## 2020-02-23 PROCEDURE — 85027 COMPLETE CBC AUTOMATED: CPT | Performed by: INTERNAL MEDICINE

## 2020-02-23 PROCEDURE — 99253 IP/OBS CNSLTJ NEW/EST LOW 45: CPT | Performed by: PSYCHIATRY & NEUROLOGY

## 2020-02-23 PROCEDURE — 25010000002 CEFTRIAXONE: Performed by: INTERNAL MEDICINE

## 2020-02-23 PROCEDURE — 94799 UNLISTED PULMONARY SVC/PX: CPT

## 2020-02-23 PROCEDURE — 80048 BASIC METABOLIC PNL TOTAL CA: CPT | Performed by: INTERNAL MEDICINE

## 2020-02-23 PROCEDURE — 99233 SBSQ HOSP IP/OBS HIGH 50: CPT | Performed by: INTERNAL MEDICINE

## 2020-02-23 RX ADMIN — NALOXONE HYDROCHLORIDE 100 ML/HR: 1 INJECTION PARENTERAL at 04:35

## 2020-02-23 RX ADMIN — NALOXONE HYDROCHLORIDE 63.7 ML/HR: 1 INJECTION PARENTERAL at 23:17

## 2020-02-23 RX ADMIN — ZIPRASIDONE HYDROCHLORIDE 20 MG: 20 CAPSULE ORAL at 17:48

## 2020-02-23 RX ADMIN — NALOXONE HYDROCHLORIDE 49 ML/HR: 1 INJECTION PARENTERAL at 09:54

## 2020-02-23 RX ADMIN — NITROFURANTOIN MONOHYDRATE/MACROCRYSTALLINE 100 MG: 25; 75 CAPSULE ORAL at 20:01

## 2020-02-23 RX ADMIN — ZONISAMIDE 400 MG: 100 CAPSULE ORAL at 20:01

## 2020-02-23 RX ADMIN — SODIUM CHLORIDE, PRESERVATIVE FREE 10 ML: 5 INJECTION INTRAVENOUS at 09:50

## 2020-02-23 RX ADMIN — BENZOCAINE AND MENTHOL 1 LOZENGE: 15; 3.6 LOZENGE ORAL at 17:10

## 2020-02-23 RX ADMIN — SODIUM CHLORIDE 100 ML/HR: 9 INJECTION, SOLUTION INTRAVENOUS at 03:18

## 2020-02-23 RX ADMIN — NALOXONE HYDROCHLORIDE 98 ML/HR: 1 INJECTION PARENTERAL at 15:46

## 2020-02-23 RX ADMIN — LEVOTHYROXINE SODIUM 25 MCG: 25 TABLET ORAL at 09:50

## 2020-02-23 RX ADMIN — NITROFURANTOIN MONOHYDRATE/MACROCRYSTALLINE 100 MG: 25; 75 CAPSULE ORAL at 09:50

## 2020-02-23 RX ADMIN — LAMOTRIGINE 200 MG: 100 TABLET ORAL at 09:50

## 2020-02-23 RX ADMIN — CEFTRIAXONE 1 G: 1 INJECTION, POWDER, FOR SOLUTION INTRAMUSCULAR; INTRAVENOUS at 11:09

## 2020-02-23 RX ADMIN — SODIUM CHLORIDE 100 ML/HR: 9 INJECTION, SOLUTION INTRAVENOUS at 13:30

## 2020-02-23 RX ADMIN — LAMOTRIGINE 200 MG: 100 TABLET ORAL at 20:01

## 2020-02-23 RX ADMIN — BENZOCAINE AND MENTHOL 1 LOZENGE: 15; 3.6 LOZENGE ORAL at 09:50

## 2020-02-24 ENCOUNTER — APPOINTMENT (OUTPATIENT)
Dept: GENERAL RADIOLOGY | Facility: HOSPITAL | Age: 20
End: 2020-02-24

## 2020-02-24 LAB
ANION GAP SERPL CALCULATED.3IONS-SCNC: 9.9 MMOL/L (ref 5–15)
BUN BLD-MCNC: 8 MG/DL (ref 6–20)
BUN/CREAT SERPL: 10 (ref 7–25)
CALCIUM SPEC-SCNC: 8.6 MG/DL (ref 8.6–10.5)
CHLORIDE SERPL-SCNC: 111 MMOL/L (ref 98–107)
CO2 SERPL-SCNC: 20.1 MMOL/L (ref 22–29)
CREAT BLD-MCNC: 0.8 MG/DL (ref 0.57–1)
DEPRECATED RDW RBC AUTO: 45.5 FL (ref 37–54)
ERYTHROCYTE [DISTWIDTH] IN BLOOD BY AUTOMATED COUNT: 13.3 % (ref 12.3–15.4)
GFR SERPL CREATININE-BSD FRML MDRD: 91 ML/MIN/1.73
GLUCOSE BLD-MCNC: 109 MG/DL (ref 65–99)
HCT VFR BLD AUTO: 36.7 % (ref 34–46.6)
HGB BLD-MCNC: 11.6 G/DL (ref 12–15.9)
MCH RBC QN AUTO: 29.2 PG (ref 26.6–33)
MCHC RBC AUTO-ENTMCNC: 31.6 G/DL (ref 31.5–35.7)
MCV RBC AUTO: 92.4 FL (ref 79–97)
PLATELET # BLD AUTO: 193 10*3/MM3 (ref 140–450)
PMV BLD AUTO: 9.6 FL (ref 6–12)
POTASSIUM BLD-SCNC: 4.1 MMOL/L (ref 3.5–5.2)
RBC # BLD AUTO: 3.97 10*6/MM3 (ref 3.77–5.28)
SODIUM BLD-SCNC: 141 MMOL/L (ref 136–145)
WBC NRBC COR # BLD: 7.6 10*3/MM3 (ref 3.4–10.8)

## 2020-02-24 PROCEDURE — 25010000002 CEFTRIAXONE: Performed by: INTERNAL MEDICINE

## 2020-02-24 PROCEDURE — 80048 BASIC METABOLIC PNL TOTAL CA: CPT | Performed by: INTERNAL MEDICINE

## 2020-02-24 PROCEDURE — 85027 COMPLETE CBC AUTOMATED: CPT | Performed by: INTERNAL MEDICINE

## 2020-02-24 PROCEDURE — 74018 RADEX ABDOMEN 1 VIEW: CPT

## 2020-02-24 PROCEDURE — 94799 UNLISTED PULMONARY SVC/PX: CPT

## 2020-02-24 PROCEDURE — 74018 RADEX ABDOMEN 1 VIEW: CPT | Performed by: RADIOLOGY

## 2020-02-24 PROCEDURE — 99232 SBSQ HOSP IP/OBS MODERATE 35: CPT | Performed by: HOSPITALIST

## 2020-02-24 RX ORDER — LACTULOSE 10 G/15ML
30 SOLUTION ORAL DAILY
Status: COMPLETED | OUTPATIENT
Start: 2020-02-24 | End: 2020-02-24

## 2020-02-24 RX ADMIN — NITROFURANTOIN MONOHYDRATE/MACROCRYSTALLINE 100 MG: 25; 75 CAPSULE ORAL at 08:26

## 2020-02-24 RX ADMIN — LEVOTHYROXINE SODIUM 25 MCG: 25 TABLET ORAL at 08:23

## 2020-02-24 RX ADMIN — LACTULOSE 30 G: 10 SOLUTION ORAL at 11:21

## 2020-02-24 RX ADMIN — NALOXONE HYDROCHLORIDE 98 ML/HR: 1 INJECTION PARENTERAL at 05:40

## 2020-02-24 RX ADMIN — ACETAMINOPHEN 650 MG: 325 TABLET ORAL at 11:42

## 2020-02-24 RX ADMIN — CEFTRIAXONE 1 G: 1 INJECTION, POWDER, FOR SOLUTION INTRAMUSCULAR; INTRAVENOUS at 10:14

## 2020-02-24 RX ADMIN — NITROFURANTOIN MONOHYDRATE/MACROCRYSTALLINE 100 MG: 25; 75 CAPSULE ORAL at 21:30

## 2020-02-24 RX ADMIN — SODIUM CHLORIDE, PRESERVATIVE FREE 10 ML: 5 INJECTION INTRAVENOUS at 21:30

## 2020-02-24 RX ADMIN — LAMOTRIGINE 200 MG: 100 TABLET ORAL at 21:30

## 2020-02-24 RX ADMIN — ZONISAMIDE 400 MG: 100 CAPSULE ORAL at 21:30

## 2020-02-24 RX ADMIN — ZIPRASIDONE HYDROCHLORIDE 20 MG: 20 CAPSULE ORAL at 08:23

## 2020-02-24 RX ADMIN — SODIUM CHLORIDE, PRESERVATIVE FREE 10 ML: 5 INJECTION INTRAVENOUS at 08:25

## 2020-02-24 RX ADMIN — ZIPRASIDONE HYDROCHLORIDE 20 MG: 20 CAPSULE ORAL at 17:50

## 2020-02-24 RX ADMIN — LAMOTRIGINE 200 MG: 100 TABLET ORAL at 08:23

## 2020-02-24 RX ADMIN — NICOTINE 1 PATCH: 14 PATCH TRANSDERMAL at 08:23

## 2020-02-25 VITALS
BODY MASS INDEX: 27.99 KG/M2 | SYSTOLIC BLOOD PRESSURE: 124 MMHG | OXYGEN SATURATION: 97 % | DIASTOLIC BLOOD PRESSURE: 74 MMHG | WEIGHT: 168 LBS | HEART RATE: 96 BPM | TEMPERATURE: 97.9 F | RESPIRATION RATE: 20 BRPM | HEIGHT: 65 IN

## 2020-02-25 PROCEDURE — 25010000002 CEFTRIAXONE PER 250 MG: Performed by: HOSPITALIST

## 2020-02-25 PROCEDURE — 99239 HOSP IP/OBS DSCHRG MGMT >30: CPT | Performed by: HOSPITALIST

## 2020-02-25 RX ORDER — CEFTRIAXONE 1 G/1
250 INJECTION, POWDER, FOR SOLUTION INTRAMUSCULAR; INTRAVENOUS ONCE
Qty: 0.25 G | Refills: 0 | Status: SHIPPED | OUTPATIENT
Start: 2020-02-25 | End: 2020-02-25

## 2020-02-25 RX ORDER — LACTULOSE 10 G/15ML
30 SOLUTION ORAL DAILY
Status: COMPLETED | OUTPATIENT
Start: 2020-02-25 | End: 2020-02-25

## 2020-02-25 RX ORDER — BISACODYL 10 MG
10 SUPPOSITORY, RECTAL RECTAL ONCE
Status: COMPLETED | OUTPATIENT
Start: 2020-02-25 | End: 2020-02-25

## 2020-02-25 RX ORDER — LACTULOSE 10 G/15ML
30 SOLUTION ORAL DAILY
Qty: 45 ML | Refills: 0 | Status: SHIPPED | OUTPATIENT
Start: 2020-02-25 | End: 2020-02-25

## 2020-02-25 RX ORDER — LACTULOSE 10 G/15ML
30 SOLUTION ORAL DAILY
Qty: 45 ML | Refills: 0
Start: 2020-02-25 | End: 2020-02-26

## 2020-02-25 RX ADMIN — LAMOTRIGINE 200 MG: 100 TABLET ORAL at 08:26

## 2020-02-25 RX ADMIN — POLYETHYLENE GLYCOL (3350) 17 G: 17 POWDER, FOR SOLUTION ORAL at 08:26

## 2020-02-25 RX ADMIN — ZIPRASIDONE HYDROCHLORIDE 20 MG: 20 CAPSULE ORAL at 15:02

## 2020-02-25 RX ADMIN — NITROFURANTOIN MONOHYDRATE/MACROCRYSTALLINE 100 MG: 25; 75 CAPSULE ORAL at 08:27

## 2020-02-25 RX ADMIN — LACTULOSE 30 G: 10 SOLUTION ORAL at 08:27

## 2020-02-25 RX ADMIN — LEVOTHYROXINE SODIUM 25 MCG: 25 TABLET ORAL at 08:27

## 2020-02-25 RX ADMIN — SODIUM CHLORIDE, PRESERVATIVE FREE 10 ML: 5 INJECTION INTRAVENOUS at 08:27

## 2020-02-25 RX ADMIN — MICONAZOLE NITRATE 200 MG: 200 SUPPOSITORY VAGINAL at 09:18

## 2020-02-25 RX ADMIN — WATER 250 MG: 1 INJECTION INTRAMUSCULAR; INTRAVENOUS; SUBCUTANEOUS at 12:05

## 2020-02-25 RX ADMIN — AZITHROMYCIN 1000 MG: 250 TABLET, FILM COATED ORAL at 15:03

## 2020-02-25 RX ADMIN — ZIPRASIDONE HYDROCHLORIDE 20 MG: 20 CAPSULE ORAL at 08:26

## 2020-02-25 RX ADMIN — BISACODYL 10 MG: 10 SUPPOSITORY RECTAL at 08:26

## 2020-02-26 NOTE — H&P
Psychiatry Inpatient Initial Eval (H+P)    Clinician: Napoleon Gomes MD  2:55 PM 02/26/20      HPI: 19 y.o. single, , 11th grade educated female who was admitted to detox unit on 2/21/2020.. Patient reports a history Bipolar disorder, borderline personality disorder, hypothyroidism, seizure/epilepsy, substance abuse, and suicide attempt. Patient had initially presented to [facility] ED the evening of 2/21 requesting detox from Heroin. She admitted to abusing Heroin, Methamphetamine and Klonopin at that time, with last use being about 3 hours prior to arrival. Lab worked revealed UTI. She requested to be checked for Chlamydia and Gonorrhea (testing later came back positive for Gonorrhea).  [facility]     Available medical/psychiatric records reviewed and incorporated into the current document.      SUBSTANCE USE HX: Patient reported a long h/o drug abuse.        UDS positive for Morphine, Amphetamine, and Opiates.      SOCIAL HX:  not obtained by this M.D. Before patient transferred off the unit.       Medications:  Hospital Medications (active)       Dose Frequency Start End    sodium chloride 0.9 % bolus 1,000 mL 1,000 mL Once 2/22/2020     Route: Intravenous           Allergies:   Allergies   Allergen Reactions   • Keppra [Levetiracetam] Other (See Comments)     Seizure                                          Labs:  Lab Results (all)     Procedure Component Value Units Date/Time    Hepatitis Panel, Acute [[id number]] Collected:  02/22/20 0235    Specimen:  Blood Updated:  02/22/20 1445    Narrative:       The following orders were created for panel order Hepatitis Panel, Acute.  Procedure                               Abnormality         Status                     ---------                               -----------         ------                     Hepatitis Panel, Acute[[id number]]       Abnormal            Final  result               Hep B Confirmation Tube[[id number]]                          Final result                 Please view results for these tests on the individual orders.    Hep B Confirmation Tube [[id number]] Collected:  02/22/20 0235    Specimen:  Blood Updated:  02/22/20 1445     Extra Tube Hold for add-ons.     Comment: Auto resulted.       Blood Gas, Arterial With Co-Ox [[id number]]  (Abnormal) Collected:  02/22/20 0429    Specimen:  Arterial Blood Updated:  02/22/20 0435     Site Right Radial     Garry's Test Positive     pH, Arterial 7.351 pH units      pCO2, Arterial 43.9 mm Hg      pO2, Arterial 119.0 mm Hg      Comment: 83 Value above reference range        HCO3, Arterial 24.3 mmol/L      Base Excess, Arterial -1.4 mmol/L      O2 Saturation, Arterial 99.0 %      Hemoglobin, Blood Gas 12.3 g/dL      Comment: 84 Value below reference range        Hematocrit, Blood Gas 37.7 %      Comment: 84 Value below reference range        Oxyhemoglobin 98.0 %      Methemoglobin 0.20 %      Carboxyhemoglobin 0.8 %      A-a Gradiant 25.4 mmHg      CO2 Content 25.6 mmol/L      Temperature 0.0 C      Barometric Pressure for Blood Gas 737 mmHg      Modality Nasal Cannula     FIO2 28 %      Flow Rate 2.0 lpm      Ventilator Mode NA     Note --     Collected by [id number]     Comment: Meter: [id number]     :  [id number]        pH, Temp Corrected --     pCO2, Temperature Corrected --     pO2, Temperature Corrected --    Hepatitis Panel, Acute [[id number]]  (Abnormal) Collected:  02/22/20 0235    Specimen:  Blood Updated:  02/22/20 0353     Hepatitis B Surface Ag Non-Reactive     Hep A IgM Non-Reactive     Hep B C IgM Non-Reactive     Hepatitis C Ab Reactive    Narrative:       Results may be falsely decreased if patient taking Biotin.     HIV-1 / O / 2 Ag / Antibody 4th Generation [[id number]]  (Normal) Collected:  02/22/20 0235    Specimen:  Blood Updated:  02/22/20 0328     HIV-1/ HIV-2 Non-Reactive     Comment: A  non-reactive test result does not preclude the possibility of exposure to HIV or infection with HIV. An antibody response to recent exposure may take several months to reach detectable levels.       Narrative:       The HIV antibody/antigen combo assay is a qualitative assay for HIV that includes the p24 antigen as well as antibodies to HIV types 1 and 2. This test is intended to be used as a screening assay in the diagnosis of HIV infection in patients over the age of 2.  Results may be falsely decreased if patient taking Biotin.      Potassium [[id number]]  (Abnormal) Collected:  02/22/20 0235    Specimen:  Blood Updated:  02/22/20 0307     Potassium 3.3 mmol/L     Blood Gas, Arterial With Co-Ox [[id number]]  (Abnormal) Collected:  02/22/20 0236    Specimen:  Arterial Blood Updated:  02/22/20 0239     Site Left Radial     Garry's Test Positive     pH, Arterial 7.055 pH units      Comment: 85 Value below critical limit        pCO2, Arterial 86.4 mm Hg      Comment: 86 Value above critical limit        pO2, Arterial 35.2 mm Hg      Comment: 85 Value below critical limit        HCO3, Arterial 24.2 mmol/L      Base Excess, Arterial -8.0 mmol/L      O2 Saturation, Arterial 43.7 %      Comment: 84 Value below reference range        Hemoglobin, Blood Gas 13.3 g/dL      Hematocrit, Blood Gas 40.8 %      Oxyhemoglobin 43.2 %      Comment: 84 Value below reference range        Methemoglobin 0.30 %      Carboxyhemoglobin 0.8 %      Comment: 84 Value below reference range        A-a Gradiant 12.2 mmHg      CO2 Content 26.8 mmol/L      Temperature 0.0 C      Barometric Pressure for Blood Gas 737 mmHg      Modality Room Air     FIO2 21 %      Ventilator Mode NA     Note --     Notified Who DR KEEN AND RN     Notified By [id number]     Notified Time 02/22/2020 02:42     Collected by [id number]     Comment: Meter: [id number]     :  [id number]        pH, Temp Corrected --     pCO2, Temperature Corrected --     pO2, Temperature  Corrected --          Imaging:  Imaging Results (All)     None            Diagnosis:  Active Problems:    Polysubstance abuse (CMS/HCC)      Patient admitted for safety and stabilization and placed on the appropriate precautions. Patient to be assigned a Masters level clinical therapist and will be provided with an opportunity to participate in individual and group therapy on the unit.      Note that this physician did not have a chance to interview or examine the patient before she was transferred off the unit to the CCU.  Hence all documentation is based on chart review.

## 2020-02-26 NOTE — DISCHARGE SUMMARY
Date of Discharge:  2/26/2020    Discharge Diagnosis:Active Problems:    Polysubstance abuse (CMS/HCC)        Presenting Problem/History of Present Illness:  19 y.o. single, , 11th grade educated female who was   admitted to detox unit on 2/21/2020, but had become unresponsive. Patient reported a history Bipolar disorder, borderline personality disorder, hypothyroidism, seizure/epilepsy, substance abuse, and suicide attempt. Patient had initially presented to [facility] ED the evening of 2/21 requesting detox from Heroin. She admitted to abusing Heroin, Methamphetamine and Klonopin at that time, with last use being about 3 hours prior to arrival. Lab worked revealed UTI. She requested to be checked for Chlamydia and Gonorrhea (testing later came back positive for Gonorrhea).  She was admitted to the [facility] in [place] for detox.    Hospital Course:  Patient was admitted for detox and stabilization.   Routine labs were checked.  Patient was assigned a masters level therapist and provided with an opportunity to participate in group and individual chemical dependency counseling on the unit.  Patient was started on the appropriate detox protocol.   On 2/22/2020,  rapid response was called after the patient was found in the floor unresponsive on detox unit.  She was moved to the CCU.       Consults:   Consults     No orders found from 1/23/2020 to 2/22/2020.          Labs:  Lab Results (all)     Procedure Component Value Units Date/Time    Hepatitis Panel, Acute [[id number]] Collected:  02/22/20 0235    Specimen:  Blood Updated:  02/22/20 1445    Narrative:       The following orders were created for panel order Hepatitis Panel, Acute.  Procedure                               Abnormality         Status                     ---------                               -----------         ------                     Hepatitis Panel, Acute[[id number]]       Abnormal            Final result               Hep B Confirmation  Tube[[id number]]                          Final result                 Please view results for these tests on the individual orders.    Hep B Confirmation Tube [[id number]] Collected:  02/22/20 0235    Specimen:  Blood Updated:  02/22/20 1445     Extra Tube Hold for add-ons.     Comment: Auto resulted.       Blood Gas, Arterial With Co-Ox [[id number]]  (Abnormal) Collected:  02/22/20 0429    Specimen:  Arterial Blood Updated:  02/22/20 0435     Site Right Radial     Garry's Test Positive     pH, Arterial 7.351 pH units      pCO2, Arterial 43.9 mm Hg      pO2, Arterial 119.0 mm Hg      Comment: 83 Value above reference range        HCO3, Arterial 24.3 mmol/L      Base Excess, Arterial -1.4 mmol/L      O2 Saturation, Arterial 99.0 %      Hemoglobin, Blood Gas 12.3 g/dL      Comment: 84 Value below reference range        Hematocrit, Blood Gas 37.7 %      Comment: 84 Value below reference range        Oxyhemoglobin 98.0 %      Methemoglobin 0.20 %      Carboxyhemoglobin 0.8 %      A-a Gradiant 25.4 mmHg      CO2 Content 25.6 mmol/L      Temperature 0.0 C      Barometric Pressure for Blood Gas 737 mmHg      Modality Nasal Cannula     FIO2 28 %      Flow Rate 2.0 lpm      Ventilator Mode NA     Note --     Collected by [id number]     Comment: Meter: [id number]     :  [id number]        pH, Temp Corrected --     pCO2, Temperature Corrected --     pO2, Temperature Corrected --    Hepatitis Panel, Acute [[id number]]  (Abnormal) Collected:  02/22/20 0235    Specimen:  Blood Updated:  02/22/20 0353     Hepatitis B Surface Ag Non-Reactive     Hep A IgM Non-Reactive     Hep B C IgM Non-Reactive     Hepatitis C Ab Reactive    Narrative:       Results may be falsely decreased if patient taking Biotin.     HIV-1 / O / 2 Ag / Antibody 4th Generation [[id number]]  (Normal) Collected:  02/22/20 0235    Specimen:  Blood Updated:  02/22/20 0328     HIV-1/ HIV-2 Non-Reactive     Comment: A non-reactive test result does not preclude the  possibility of exposure to HIV or infection with HIV. An antibody response to recent exposure may take several months to reach detectable levels.       Narrative:       The HIV antibody/antigen combo assay is a qualitative assay for HIV that includes the p24 antigen as well as antibodies to HIV types 1 and 2. This test is intended to be used as a screening assay in the diagnosis of HIV infection in patients over the age of 2.  Results may be falsely decreased if patient taking Biotin.      Potassium [[id number]]  (Abnormal) Collected:  02/22/20 0235    Specimen:  Blood Updated:  02/22/20 0307     Potassium 3.3 mmol/L     Blood Gas, Arterial With Co-Ox [[id number]]  (Abnormal) Collected:  02/22/20 0236    Specimen:  Arterial Blood Updated:  02/22/20 0239     Site Left Radial     Garry's Test Positive     pH, Arterial 7.055 pH units      Comment: 85 Value below critical limit        pCO2, Arterial 86.4 mm Hg      Comment: 86 Value above critical limit        pO2, Arterial 35.2 mm Hg      Comment: 85 Value below critical limit        HCO3, Arterial 24.2 mmol/L      Base Excess, Arterial -8.0 mmol/L      O2 Saturation, Arterial 43.7 %      Comment: 84 Value below reference range        Hemoglobin, Blood Gas 13.3 g/dL      Hematocrit, Blood Gas 40.8 %      Oxyhemoglobin 43.2 %      Comment: 84 Value below reference range        Methemoglobin 0.30 %      Carboxyhemoglobin 0.8 %      Comment: 84 Value below reference range        A-a Gradiant 12.2 mmHg      CO2 Content 26.8 mmol/L      Temperature 0.0 C      Barometric Pressure for Blood Gas 737 mmHg      Modality Room Air     FIO2 21 %      Ventilator Mode NA     Note --     Notified Who DR KEEN AND RN     Notified By [id number]     Notified Time 02/22/2020 02:42     Collected by [id number]     Comment: Meter: [id number]     :  [id number]        pH, Temp Corrected --     pCO2, Temperature Corrected --     pO2, Temperature Corrected --          Imaging:  Imaging  Results (All)     None                  Condition on Discharge: guarded    Vital Signs       Discharge Disposition  [facility]    Discharge Medications     Discharge Medications      ASK your doctor about these medications      Instructions Start Date   LAMICTAL 200 MG tablet  Generic drug:  lamoTRIgine   200 mg, Oral, 2 Times Daily      levothyroxine 25 MCG tablet  Commonly known as:  SYNTHROID, LEVOTHROID   25 mcg, Oral, Daily      ziprasidone 20 MG capsule  Commonly known as:  GEODON   20 mg, Oral, 2 Times Daily With Meals      zonisamide 100 MG capsule  Commonly known as:  ZONEGRAN   400 mg, Oral, Nightly             Discharge Diet:   Diet Instructions     intubated                 Follow-up Appointments: transferred to ccu      Napoleon Gomes MD  02/26/20  2:50 PM

## 2020-02-27 LAB
BACTERIA SPEC AEROBE CULT: NORMAL
BACTERIA SPEC AEROBE CULT: NORMAL

## 2020-05-01 ENCOUNTER — HOSPITAL ENCOUNTER (EMERGENCY)
Facility: HOSPITAL | Age: 20
Discharge: HOME | End: 2020-05-02
Attending: FAMILY MEDICINE | Admitting: FAMILY MEDICINE

## 2020-05-01 DIAGNOSIS — F19.10 SUBSTANCE ABUSE (HCC): Primary | ICD-10-CM

## 2020-05-01 LAB
ALBUMIN SERPL-MCNC: 4.78 G/DL (ref 3.5–5.2)
ALBUMIN/GLOB SERPL: 1.8 G/DL
ALP SERPL-CCNC: 95 U/L (ref 39–117)
ALT SERPL W P-5'-P-CCNC: 11 U/L (ref 1–33)
ANION GAP SERPL CALCULATED.3IONS-SCNC: 11.1 MMOL/L (ref 5–15)
APAP SERPL-MCNC: <5 MCG/ML (ref 10–30)
AST SERPL-CCNC: 17 U/L (ref 1–32)
BASOPHILS # BLD AUTO: 0.04 10*3/MM3 (ref 0–0.2)
BASOPHILS NFR BLD AUTO: 0.4 % (ref 0–1.5)
BILIRUB SERPL-MCNC: <0.2 MG/DL (ref 0.2–1.2)
BUN BLD-MCNC: 13 MG/DL (ref 6–20)
BUN/CREAT SERPL: 11.5 (ref 7–25)
CALCIUM SPEC-SCNC: 9.3 MG/DL (ref 8.6–10.5)
CHLORIDE SERPL-SCNC: 105 MMOL/L (ref 98–107)
CO2 SERPL-SCNC: 25.9 MMOL/L (ref 22–29)
CREAT BLD-MCNC: 1.13 MG/DL (ref 0.57–1)
DEPRECATED RDW RBC AUTO: 46 FL (ref 37–54)
EOSINOPHIL # BLD AUTO: 0.21 10*3/MM3 (ref 0–0.4)
EOSINOPHIL NFR BLD AUTO: 2 % (ref 0.3–6.2)
ERYTHROCYTE [DISTWIDTH] IN BLOOD BY AUTOMATED COUNT: 13.3 % (ref 12.3–15.4)
ETHANOL BLD-MCNC: <10 MG/DL (ref 0–10)
ETHANOL UR QL: <0.01 %
GFR SERPL CREATININE-BSD FRML MDRD: 61 ML/MIN/1.73
GLOBULIN UR ELPH-MCNC: 2.7 GM/DL
GLUCOSE BLD-MCNC: 96 MG/DL (ref 65–99)
HCT VFR BLD AUTO: 43.9 % (ref 34–46.6)
HGB BLD-MCNC: 14.2 G/DL (ref 12–15.9)
IMM GRANULOCYTES # BLD AUTO: 0.03 10*3/MM3 (ref 0–0.05)
IMM GRANULOCYTES NFR BLD AUTO: 0.3 % (ref 0–0.5)
LYMPHOCYTES # BLD AUTO: 4.47 10*3/MM3 (ref 0.7–3.1)
LYMPHOCYTES NFR BLD AUTO: 43.1 % (ref 19.6–45.3)
MAGNESIUM SERPL-MCNC: 2.3 MG/DL (ref 1.6–2.6)
MCH RBC QN AUTO: 29.7 PG (ref 26.6–33)
MCHC RBC AUTO-ENTMCNC: 32.3 G/DL (ref 31.5–35.7)
MCV RBC AUTO: 91.8 FL (ref 79–97)
MONOCYTES # BLD AUTO: 0.48 10*3/MM3 (ref 0.1–0.9)
MONOCYTES NFR BLD AUTO: 4.6 % (ref 5–12)
NEUTROPHILS # BLD AUTO: 5.14 10*3/MM3 (ref 1.7–7)
NEUTROPHILS NFR BLD AUTO: 49.6 % (ref 42.7–76)
NRBC BLD AUTO-RTO: 0 /100 WBC (ref 0–0.2)
PLATELET # BLD AUTO: 262 10*3/MM3 (ref 140–450)
PMV BLD AUTO: 9.7 FL (ref 6–12)
POTASSIUM BLD-SCNC: 3.4 MMOL/L (ref 3.5–5.2)
PROT SERPL-MCNC: 7.5 G/DL (ref 6–8.5)
RBC # BLD AUTO: 4.78 10*6/MM3 (ref 3.77–5.28)
SALICYLATES SERPL-MCNC: <0.3 MG/DL
SODIUM BLD-SCNC: 142 MMOL/L (ref 136–145)
WBC NRBC COR # BLD: 10.37 10*3/MM3 (ref 3.4–10.8)

## 2020-05-01 PROCEDURE — 80307 DRUG TEST PRSMV CHEM ANLYZR: CPT | Performed by: FAMILY MEDICINE

## 2020-05-01 PROCEDURE — 83735 ASSAY OF MAGNESIUM: CPT | Performed by: FAMILY MEDICINE

## 2020-05-01 PROCEDURE — 80053 COMPREHEN METABOLIC PANEL: CPT | Performed by: FAMILY MEDICINE

## 2020-05-01 PROCEDURE — 99285 EMERGENCY DEPT VISIT HI MDM: CPT

## 2020-05-01 PROCEDURE — 85025 COMPLETE CBC W/AUTO DIFF WBC: CPT | Performed by: FAMILY MEDICINE

## 2020-05-01 RX ORDER — SODIUM CHLORIDE 0.9 % (FLUSH) 0.9 %
10 SYRINGE (ML) INJECTION AS NEEDED
Status: DISCONTINUED | OUTPATIENT
Start: 2020-05-01 | End: 2020-05-02 | Stop reason: HOSPADM

## 2020-05-01 RX ADMIN — SODIUM CHLORIDE 1000 ML: 9 INJECTION, SOLUTION INTRAVENOUS at 22:32

## 2020-05-02 VITALS
HEIGHT: 66 IN | SYSTOLIC BLOOD PRESSURE: 91 MMHG | HEART RATE: 71 BPM | RESPIRATION RATE: 18 BRPM | BODY MASS INDEX: 22.5 KG/M2 | OXYGEN SATURATION: 95 % | DIASTOLIC BLOOD PRESSURE: 54 MMHG | WEIGHT: 140 LBS | TEMPERATURE: 97.5 F

## 2020-05-02 LAB
6-ACETYL MORPHINE: POSITIVE
AMPHET+METHAMPHET UR QL: POSITIVE
B-HCG UR QL: NEGATIVE
BACTERIA UR QL AUTO: ABNORMAL /HPF
BARBITURATES UR QL SCN: NEGATIVE
BENZODIAZ UR QL SCN: NEGATIVE
BILIRUB UR QL STRIP: ABNORMAL
BUPRENORPHINE SERPL-MCNC: NEGATIVE NG/ML
CANNABINOIDS SERPL QL: NEGATIVE
CLARITY UR: ABNORMAL
COCAINE UR QL: POSITIVE
COD CRY URNS QL: ABNORMAL /HPF
COLOR UR: ABNORMAL
GLUCOSE UR STRIP-MCNC: NEGATIVE MG/DL
HGB UR QL STRIP.AUTO: ABNORMAL
HYALINE CASTS UR QL AUTO: ABNORMAL /LPF
KETONES UR QL STRIP: NEGATIVE
LEUKOCYTE ESTERASE UR QL STRIP.AUTO: ABNORMAL
METHADONE UR QL SCN: NEGATIVE
NITRITE UR QL STRIP: POSITIVE
OPIATES UR QL: POSITIVE
OXYCODONE UR QL SCN: NEGATIVE
PCP UR QL SCN: NEGATIVE
PH UR STRIP.AUTO: <=5 [PH] (ref 5–8)
PROT UR QL STRIP: ABNORMAL
RBC # UR: ABNORMAL /HPF
REF LAB TEST METHOD: ABNORMAL
SP GR UR STRIP: 1.03 (ref 1–1.03)
SQUAMOUS #/AREA URNS HPF: ABNORMAL /HPF
UROBILINOGEN UR QL STRIP: ABNORMAL
WBC UR QL AUTO: ABNORMAL /HPF

## 2020-05-02 PROCEDURE — 80307 DRUG TEST PRSMV CHEM ANLYZR: CPT | Performed by: FAMILY MEDICINE

## 2020-05-02 PROCEDURE — 81025 URINE PREGNANCY TEST: CPT | Performed by: FAMILY MEDICINE

## 2020-05-02 PROCEDURE — 96365 THER/PROPH/DIAG IV INF INIT: CPT

## 2020-05-02 PROCEDURE — 81001 URINALYSIS AUTO W/SCOPE: CPT | Performed by: FAMILY MEDICINE

## 2020-05-02 PROCEDURE — 25010000002 CEFTRIAXONE PER 250 MG: Performed by: FAMILY MEDICINE

## 2020-05-02 PROCEDURE — 90792 PSYCH DIAG EVAL W/MED SRVCS: CPT | Performed by: PSYCHIATRY & NEUROLOGY

## 2020-05-02 PROCEDURE — 87086 URINE CULTURE/COLONY COUNT: CPT | Performed by: FAMILY MEDICINE

## 2020-05-02 RX ADMIN — SODIUM CHLORIDE 1000 ML: 9 INJECTION, SOLUTION INTRAVENOUS at 01:14

## 2020-05-02 RX ADMIN — CEFTRIAXONE 1 G: 1 INJECTION, POWDER, FOR SOLUTION INTRAMUSCULAR; INTRAVENOUS at 01:14

## 2020-05-02 NOTE — ED NOTES
Report given to Audrey Tolbert RN in intake. Patient IV removed and patient walked back to intake.      Marjan Shay RN  05/02/20 0250

## 2020-05-02 NOTE — DISCHARGE INSTRUCTIONS
Home in care of mother.  Please do not abuse drugs.  Please seek treatment for your substance abuse disorder.  See your family doctor in the office on Monday.  Return the emergency department right away if symptoms worsen/any problems.   Pt was on antbx while in the hospital. Pt is being discharged with no antibiotics.

## 2020-05-02 NOTE — NURSING NOTE
Received report from Audrey RAMEY. Patient is  resting in ED 5 with eyes closed. Vital signs stable this am. Pt is not displaying any signs of withdrawal at this time. Suicide screen re performed per policy. Patient denies any suicidal ideation and adamantly denies any suicide attempt. Safety checks maintained. Waiting for Dr. Stevens to come and see the patient in person this am.

## 2020-05-02 NOTE — NURSING NOTE
Patient resting with eyes closed in  Room ED6. Within site of staff. Waiting on her mother to arrive to pick her up.

## 2020-05-02 NOTE — NURSING NOTE
Patient resting comfortably in Room 5. Requested breakfast and I informed her the kitchen is currently closed but we will get her breakfast as soon as we can. I gave her crackers earlier for nausea. Will continue to monitor.

## 2020-05-02 NOTE — NURSING NOTE
Presented clinicals to Dr. Stevens. Reviewed assessment findings, lab results, and vital signs. Patient does not meet admission criteria at this time. Per Dr. Stevens, patient has previously violated unit rules and is not appropriate for the detox unit. He states that detox is not medically necessary due to the patient's UDS currently being positive for morphine, cocaine, meth, and opiates. He states that she has proven to be capable of travelling safely to multiple hospitals in the past 24 hours and has not been found to be in withdrawal at any of the hospitals. Will continue to monitor.

## 2020-05-02 NOTE — NURSING NOTE
----- Message from Ar HARMON MD sent at 2/7/2020  4:02 PM EST -----  Regarding: CT scan results  Okay to call results, suggestion of a three-quarter inch lesion in the body of the pancreas believed to be either a pseudocyst or a cystic neoplasm.  Recommendations made for conservative approach to recheck in 1 year.  This would not explain her bouts of diarrhea.  Would offer a hydrogen breath test to rule out bacterial overgrowth.  She can follow-up to discuss these options if she wishes.  ----- Message -----  From: Interface, Rad Results [place] [facility]  Sent: 2/7/2020   8:06 AM EST  To: Ar HARMON MD    **Call to pt.  Advise of above.  Verb understanding.  Agreeable to SIBO testing - advise that kit will be mailed to pt's home.  [facility] requisition completed.   CT for 2/4/21 placed in recall.  Katheryn Guy RN.          Patient awake at this time and reports that she is hungry and has been here all night and states that she understands that she is not in withdrawal but does not understand what she is waiting for. Explained to the patient the ED provider felt that she would benefit from a psych evaluation from the on call psychiatrist and that he was on his way in to the hospital to see her in person. Patient asked if she felt like she was having more withdrawals at this time and she stated No, I just want something to eat. Called dietary for breakfast tray. Safety checks maintained.

## 2020-05-02 NOTE — NURSING NOTE
"Assessment complete. Patient presented to ER stating she had overdosed on heroin laced with fentanyl. She states that she wants detox from \"all the stuff\". Patient reports taking crack cocaine, meth, heroin, and some \"fentanyl I found in my purse\". Patient had previously visited two other hospitals in the past 24 hours with no proper diagnosis found or admit completed. Patient has one TC admit back in Feb when she had heroin hidden in her vagina and overdosed on the detox unit by eating it. Patient is unemployed and recently moved in with her mother. 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 Patient rates anxiety 4/10, depression 6/10, denies SI/HI/AVH. COWS 4, CIWA 3, patient denies cravings. Will continue to monitor.   "
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
## 2020-05-02 NOTE — NURSING NOTE
Per Dr. Stevens, he has met with the patient and has spoken to her about her situation and feels that she is not a suicide risk at this time. He also states that he feels that she would benefit more in a outpatient setting or long term treatment and at this time does not meet criteria for acute admission to the detox unit. Detox unit provided to patient per Dr. Stevens request.  Patient verbalized understanding and states that this is what she was wanting the whole time. She states that  she plans on looking thru the detox packet with her mother and finding a long term treatment. ED provider made aware of plan of care by Dr. Stevens.      DC in care of mother, and to call to follow up in [facility] if interested and detox packet provided. Rbvox2.     Patient requested and used the phone and called her mother for a ride home.

## 2020-05-02 NOTE — NURSING NOTE
"Informed Dr. Infante that patient is going to be discharged. She refused to discharge patient stating that \"Dr. Stevens could do it on his license\". Patient continues to deny SI/HI/AVH. Will continue to monitor.   "

## 2020-05-02 NOTE — NURSING NOTE
Patients family phone intake area. Instructed that they should be here shortly to  the patient. Patient allowed to change into her clothing. But personal items kept at intake desk until family arrives.

## 2020-05-03 LAB — BACTERIA SPEC AEROBE CULT: NORMAL

## 2023-10-03 NOTE — NURSING NOTE
Patient presents to Intake from ER. Patient was changed and searched thoroughly with 2 staff members present. Belongings locked in cabinet.    No indicators present